# Patient Record
(demographics unavailable — no encounter records)

---

## 2024-10-16 NOTE — HISTORY OF PRESENT ILLNESS
[de-identified] : 77 year old female follow up dysphagia last seen in April 2024 S/p Microsuspension direct laryngoscopy with excision of multiple laryngeal masses, injection laryngoplasty, excision of pharyngeal and base of tongue masses 12/09/22. Voice stable since last seen -- continues to endorse thick mucus.  Did not restart swallow therapy but no dysphagia concerns at this time.  Increasing hydration intake.  Taking Famotidine 20mg 1x daily.  Denies recent fevers or infections.  History of migraines -- follows up with neuro -- on aimovig, rizatriptan, on botox injections Had one episode of room spinning dizziness since last seen -- recent move -- increase in stress.  Denies otalgia, otorrhea, ear infections.  Hx of bilateral cataract surgery in 7/2023 and 9/2023, 12/2023. States had biopsy of left second toe, SCC, removed

## 2024-10-16 NOTE — CONSULT LETTER
[Dear  ___] : Dear  [unfilled], [Consult Letter:] : I had the pleasure of evaluating your patient, [unfilled]. [Please see my note below.] : Please see my note below. [Consult Closing:] : Thank you very much for allowing me to participate in the care of this patient.  If you have any questions, please do not hesitate to contact me. [Sincerely,] : Sincerely, [FreeTextEntry2] : Williams Reed MD  [FreeTextEntry3] : Dario Figueroa MD, PhD\par  Chief, Division of Laryngology\par  Department of Otolaryngology\par  [facility]\par  Pediatric Otolaryngology, [facility]\par   of Otolaryngology\par  [facility]

## 2025-06-09 NOTE — PHYSICAL EXAM
[Appropriately responsive] : appropriately responsive [Alert] : alert [No Acute Distress] : no acute distress [Soft] : soft [Non-tender] : non-tender [Oriented x3] : oriented x3 [Examination Of The Breasts] : a normal appearance [No Masses] : no breast masses were palpable [Vulvar Atrophy] : vulvar atrophy [Labia Majora] : normal [Labia Minora] : normal [Atrophy] : atrophy [Normal] : normal [Tenderness] : nontender [Enlarged ___ wks] : not enlarged [Mass ___ cm] : no uterine mass was palpated [Uterine Adnexae] : non-palpable [No Tenderness] : no tenderness [FreeTextEntry9] : guaiac-

## 2025-06-09 NOTE — HISTORY OF PRESENT ILLNESS
[Currently Active] : currently active [Men] : men [Vaginal] : vaginal [Mammogramdate] : 6/2024 [TextBox_4] : No vb, takes vit d and exercises (pilates). Has osteoporosis and on prolia. Last BMD improved in total hip and spine, decreased in femoral neck. Recommended rheum consult but she didnt go because she had larynx surgery. Due for BMD now. [BoneDensityDate] : 4/2023 [ColonoscopyDate] : 5 yrs ago

## 2025-07-16 NOTE — CONSULT LETTER
[Dear  ___] : Dear  [unfilled], [Consult Letter:] : I had the pleasure of evaluating your patient, [unfilled]. [Please see my note below.] : Please see my note below. [Consult Closing:] : Thank you very much for allowing me to participate in the care of this patient.  If you have any questions, please do not hesitate to contact me. [Sincerely,] : Sincerely, [FreeTextEntry2] : Williams Reed MD  [FreeTextEntry3] : Dario Figueroa MD, PhD\par  Chief, Division of Laryngology\par  Department of Otolaryngology\par  [facility]\par  Pediatric Otolaryngology, [facility]\par   of Otolaryngology\par  [facility]

## 2025-07-16 NOTE — HISTORY OF PRESENT ILLNESS
[de-identified] : 77 year old female follow up dysphagia last seen in October 2024 S/p Microsuspension direct laryngoscopy with excision of multiple laryngeal masses, injection laryngoplasty, excision of pharyngeal and base of tongue masses 12/09/22. Voice stable since last seen -- continues to endorse coughing episodes and thick mucus Did not restart swallow therapy but no dysphagia concerns at this time.  Increasing hydration intake.  Taking Famotidine 20mg 1x daily.  Denies recent fevers or infections.  History of migraines -- follows up with neuro -- on aimovig, rizatriptan, on botox injections Remains with intermittent dizziness when standing up too fast, stable. Denies otalgia, otorrhea, ear infections.  Hx of bilateral cataract surgery in 7/2023 and 9/2023, 12/2023. States had biopsy of left second toe, SCC, removed